# Patient Record
Sex: MALE | Race: WHITE | NOT HISPANIC OR LATINO | Employment: FULL TIME | URBAN - METROPOLITAN AREA
[De-identification: names, ages, dates, MRNs, and addresses within clinical notes are randomized per-mention and may not be internally consistent; named-entity substitution may affect disease eponyms.]

---

## 2023-10-22 ENCOUNTER — APPOINTMENT (OUTPATIENT)
Dept: RADIOLOGY | Facility: IMAGING CENTER | Age: 43
End: 2023-10-22
Attending: PHYSICIAN ASSISTANT
Payer: COMMERCIAL

## 2023-10-22 ENCOUNTER — OFFICE VISIT (OUTPATIENT)
Dept: URGENT CARE | Facility: CLINIC | Age: 43
End: 2023-10-22
Payer: COMMERCIAL

## 2023-10-22 VITALS
TEMPERATURE: 98.5 F | HEART RATE: 86 BPM | DIASTOLIC BLOOD PRESSURE: 86 MMHG | BODY MASS INDEX: 25.2 KG/M2 | SYSTOLIC BLOOD PRESSURE: 132 MMHG | RESPIRATION RATE: 20 BRPM | WEIGHT: 180 LBS | HEIGHT: 71 IN | OXYGEN SATURATION: 97 %

## 2023-10-22 DIAGNOSIS — S62.522A CLOSED FRACTURE OF TUFT OF DISTAL PHALANX OF LEFT THUMB: ICD-10-CM

## 2023-10-22 DIAGNOSIS — S61.012A LACERATION OF LEFT THUMB WITHOUT FOREIGN BODY WITHOUT DAMAGE TO NAIL, INITIAL ENCOUNTER: ICD-10-CM

## 2023-10-22 DIAGNOSIS — S69.92XA INJURY OF LEFT THUMB, INITIAL ENCOUNTER: ICD-10-CM

## 2023-10-22 PROCEDURE — 73140 X-RAY EXAM OF FINGER(S): CPT | Mod: TC,FY,LT | Performed by: RADIOLOGY

## 2023-10-22 PROCEDURE — 90715 TDAP VACCINE 7 YRS/> IM: CPT | Performed by: PHYSICIAN ASSISTANT

## 2023-10-22 PROCEDURE — 12041 INTMD RPR N-HF/GENIT 2.5CM/<: CPT | Performed by: PHYSICIAN ASSISTANT

## 2023-10-22 PROCEDURE — 3079F DIAST BP 80-89 MM HG: CPT | Performed by: PHYSICIAN ASSISTANT

## 2023-10-22 PROCEDURE — 99202 OFFICE O/P NEW SF 15 MIN: CPT | Mod: 25 | Performed by: PHYSICIAN ASSISTANT

## 2023-10-22 PROCEDURE — 90471 IMMUNIZATION ADMIN: CPT | Performed by: PHYSICIAN ASSISTANT

## 2023-10-22 PROCEDURE — 3075F SYST BP GE 130 - 139MM HG: CPT | Performed by: PHYSICIAN ASSISTANT

## 2023-10-22 RX ORDER — CEPHALEXIN 500 MG/1
500 CAPSULE ORAL 4 TIMES DAILY
Qty: 20 CAPSULE | Refills: 0 | Status: SHIPPED | OUTPATIENT
Start: 2023-10-22 | End: 2023-10-27

## 2023-10-22 RX ORDER — IBUPROFEN 200 MG
200 TABLET ORAL EVERY 6 HOURS PRN
COMMUNITY

## 2023-10-22 RX ORDER — CEPHALEXIN 500 MG/1
500 CAPSULE ORAL 4 TIMES DAILY
Qty: 20 CAPSULE | Refills: 0 | Status: SHIPPED | OUTPATIENT
Start: 2023-10-22 | End: 2023-10-22

## 2023-10-22 NOTE — PROGRESS NOTES
"Subjective:   Justin Keller is a 42 y.o. male who presents for Finger Injury (Lt thumb, as he was trying to lift heavy rock: fell down to finger x 1 day around 2100, Advil controls throbbing pain)  This is a pleasant 42-year-old male who was out for while driving last night, to move/lift heavy rock that was in the way with a rock fell onto his thumb.  Since then he has had ongoing bleeding as well as some throbbing pain.  He did treat this with a Band-Aid.  He denies numbness or tingling.  He reports mild decreased range of motion due to swelling.  He has tried ibuprofen with some improvement.  He denies fevers or chills.  Tetanus is not up-to-date.      Medications:  ibuprofen Tabs    Allergies:             Patient has no known allergies.    Surgical History:       No past surgical history on file.    Past Social Hx:  Justin Keller  reports that he has never smoked. He has never used smokeless tobacco. He reports current alcohol use. He reports that he does not use drugs.     Past Family Hx:   Justin Keller family history is not on file.       Problem list, medications, and allergies reviewed by myself today in Epic.     Objective:     /86 (BP Location: Left arm, Patient Position: Sitting, BP Cuff Size: Adult)   Pulse 86   Temp 36.9 °C (98.5 °F) (Temporal)   Resp 20   Ht 1.803 m (5' 11\")   Wt 81.6 kg (180 lb)   SpO2 97%   BMI 25.10 kg/m²     Physical Exam  Musculoskeletal:      Comments: Laceration with superficial abrasion noted to left thumb.'s approximately 1 cm laceration extending into nail fold without obvious subungual hematoma.  No obvious nail avulsion.  Approximately 1 cm laceration/abrasion noted to PIP region, superficial.  No bony or tendinous involvement in either.  Full range of motion is noted at the MCP with slightly limited range of motion at the IP joint.         Procedure: Laceration Repair  Risks including bleeding, nerve damage, infection, and poor cosmetic outcome " discussed. Benefits and alternatives discussed.   Clean technique with sterile instruments and suture used  Local anesthesia with 1% lidocaine  Wound was copiously irrigated  No foreign bodies were seen or felt.  Closed with   4 -0 Nylon interrupted sutures with good wound approximation  Polysporin and dressing placed  Patient tolerated well.  Patient instructed to return in 10 days for suture removal     RADIOLOGY RESULTS   DX-FINGER(S) 2+ LEFT    Result Date: 10/22/2023  10/22/2023 2:51 PM HISTORY/REASON FOR EXAM:  Crush injury first digit. TECHNIQUE/EXAM DESCRIPTION AND NUMBER OF VIEWS:   3 views of the LEFT fingers. COMPARISON: None FINDINGS: Bone mineralization is normal.  Comminuted mildly displaced acute fracture involving the majority of the distal phalanx of the first digit is identified. One fracture line does appear to extend to the proximal articular surface along the ulnar side of the joint.  There is no evidence of arthropathy.  Soft tissue swelling is noted.     Comminuted acute fracture involving distal phalanx of the first digit is identified.         *X-rays were reviewed and interpreted independently by me. I agree with the radiologist's findings       Assessment/Plan:     Diagnosis and Associated Orders:     1. Injury of left thumb, initial encounter  - DX-FINGER(S) 2+ LEFT  - Tdap =>8yo IM  - cephALEXin (KEFLEX) 500 MG Cap; Take 1 Capsule by mouth 4 times a day for 5 days.  Dispense: 20 Capsule; Refill: 0  - cephALEXin (KEFLEX) 500 MG Cap; Take 1 Capsule by mouth 4 times a day for 5 days.  Dispense: 20 Capsule; Refill: 0    2. Laceration of left thumb without foreign body without damage to nail, initial encounter  - cephALEXin (KEFLEX) 500 MG Cap; Take 1 Capsule by mouth 4 times a day for 5 days.  Dispense: 20 Capsule; Refill: 0  - cephALEXin (KEFLEX) 500 MG Cap; Take 1 Capsule by mouth 4 times a day for 5 days.  Dispense: 20 Capsule; Refill: 0    3. Closed fracture of tuft of distal phalanx of  left thumb  - Referral to Orthopedics  - cephALEXin (KEFLEX) 500 MG Cap; Take 1 Capsule by mouth 4 times a day for 5 days.  Dispense: 20 Capsule; Refill: 0  - cephALEXin (KEFLEX) 500 MG Cap; Take 1 Capsule by mouth 4 times a day for 5 days.  Dispense: 20 Capsule; Refill: 0    Other orders  - ibuprofen (ADVIL) 200 MG Tab; Take 200 mg by mouth every 6 hours as needed. Last dosage was on 10/22/23 around 12:00 PM  of 200 MG        Comments/MDM:  X-ray consistent with comminuted acute fracture of the distal phalanx of the first digit.  Wound dressed appropriately after laceration repair.  Placed in finger splint.  Patient lives in [place].  A prescription for Keflex was printed.  Tetanus was updated.  Wound care discussed at length.  Return precautions discussed.    Keep bandage clean and dry. Remove if it gets wet or soiled. Keep site covered until recheck.  Do not submerge site in water, change wound tomorrow.  May use Ibuprofen or Tylenol for discomfort  Monitor for infection: redness, swelling, discharge, fever, wound site starts coming apart- needs re-evaluation immediately  Follow-up in [place] with orthopedics      I personally reviewed prior external notes and test results pertinent to today's visit. Supportive care, natural history, differential diagnoses, and indications for immediate follow-up discussed. Return to clinic or go to ED if symptoms worsen or persist.  Red flag symptoms discussed.  Patient/Parent/Guardian voices understanding. Follow-up with your primary care provider in 3-5 days.  All side effects of medication discussed including allergic response, GI upset, tendon injury, rash, sedation etc    Please note that this dictation was created using voice recognition software. I have made a reasonable attempt to correct obvious errors, but I expect that there are errors of grammar and possibly content that I did not discover before finalizing the note.    This note was electronically signed by Ashleigh  JADE Alejandro